# Patient Record
Sex: FEMALE | Race: WHITE
[De-identification: names, ages, dates, MRNs, and addresses within clinical notes are randomized per-mention and may not be internally consistent; named-entity substitution may affect disease eponyms.]

---

## 2018-04-10 ENCOUNTER — HOSPITAL ENCOUNTER (EMERGENCY)
Dept: HOSPITAL 97 - ER | Age: 73
Discharge: HOME | End: 2018-04-10
Payer: COMMERCIAL

## 2018-04-10 DIAGNOSIS — M79.671: Primary | ICD-10-CM

## 2018-04-10 DIAGNOSIS — Z88.6: ICD-10-CM

## 2018-04-10 DIAGNOSIS — Z88.0: ICD-10-CM

## 2018-04-10 PROCEDURE — 99283 EMERGENCY DEPT VISIT LOW MDM: CPT

## 2018-04-10 NOTE — RAD REPORT
EXAM DESCRIPTION:  RAD - Foot Right 3 View - 4/10/2018 11:43 am

 

CLINICAL HISTORY:  Foot and ankle pain.

 

COMPARISON:  None.

 

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 Prominent c
alcaneal spur is noted along the plantar aspect. No acute fracture or dislocation seen.

## 2018-04-10 NOTE — EDPHYS
Physician Documentation                                                                           

 [facility]                                                                

Name: Corrie Carter                                                                             

Age: 72 yrs                                                                                       

Sex: Female                                                                                       

: 1945                                                                                   

MRN: [id number]                                                                                   

Arrival Date: 04/10/2018                                                                          

Time: 10:23                                                                                       

Account#: [id number]                                                                            

Bed 12                                                                                            

Private MD: Ramy Bob ED Physician Omkar Kelly                                                                         

HPI:                                                                                              

04/10                                                                                             

11:11 This 72 yrs old  Female presents to ER via Wheelchair with complaints of Foot  jr8 

      Pain.                                                                                       

11:11 The patient presents with pain, tenderness. The complaints affect the medial aspect of  jr8 

      right foot. Context: The problem was sustained at home, resulted from an unknown cause.     

      Onset: The symptoms/episode began/occurred gradually, 2 day(s) ago. Modifying factors:      

      The symptoms are alleviated by nothing. the symptoms are aggravated by weight bearing.      

      Associated signs and symptoms: Pertinent positives:. Severity of symptoms: At their         

      worst the symptoms were mild, in the emergency department the symptoms are unchanged.       

      The patient has not experienced similar symptoms in the past. The patient has not           

      recently seen a physician. Patient stated that she was helping relative move boxes and      

      other items the other day. Now having right foot pain. Worse with weight bearing .          

                                                                                                  

Historical:                                                                                       

- Allergies:                                                                                      

10:30 Codeine;                                                                                tw2 

10:30 Darvon;                                                                                 tw2 

10:30 PENICILLINS;                                                                            tw2 

10:30 propoxyphene;                                                                           tw2 

- Home Meds:                                                                                      

10:30 levothyroxine oral [Active]; rivastigmine tartrate oral oral [Active]; bladder          tw2 

      medication [Active];                                                                        

- PSHx:                                                                                           

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 

                                                                                                  

- Immunization history:: Adult Immunizations up to date.                                          

- Social history:: Smoking status: Patient/guardian denies using tobacco.                         

                                                                                                  

                                                                                                  

ROS:                                                                                              

11:11 Eyes: Negative for injury, pain, redness, and discharge, ENT: Negative for injury,      jr8 

      pain, and discharge, Neck: Negative for injury, pain, and swelling, Cardiovascular:         

      Negative for chest pain, palpitations, and edema, Respiratory: Negative for shortness       

      of breath, cough, wheezing, and pleuritic chest pain, Abdomen/GI: Negative for              

      abdominal pain, nausea, vomiting, diarrhea, and constipation, Back: Negative for injury     

      and pain, Skin: Negative for injury, rash, and discoloration, Neuro: Negative for           

      headache, weakness, numbness, tingling, and seizure.                                        

11:11 MS/extremity: Positive for pain, tenderness, of the right foot.                             

                                                                                                  

Exam:                                                                                             

11:11 Cardiovascular:  Regular rate and rhythm with a normal S1 and S2.  No gallops, murmurs, jr8 

      or rubs.  Normal PMI, no JVD.  No pulse deficits. Respiratory:  Lungs have equal breath     

      sounds bilaterally, clear to auscultation and percussion.  No rales, rhonchi or wheezes     

      noted.  No increased work of breathing, no retractions or nasal flaring. Skin:  Warm,       

      dry with normal turgor.  Normal color with no rashes, no lesions, and no evidence of        

      cellulitis. Neuro:  Awake and alert, GCS 15, oriented to person, place, time, and           

      situation.  Cranial nerves II-XII grossly intact.  Motor strength 5/5 in all                

      extremities.  Sensory grossly intact.  Cerebellar exam normal.  Normal gait.                

11:11 Musculoskeletal/extremity: Extremities: grossly normal except: noted in the medial          

      aspect of right foot: pain, tenderness, ROM: intact in all extremities, full active         

      range of motion, full passive range of motion, Circulation is intact in all                 

      extremities. Sensation intact.                                                              

                                                                                                  

Vital Signs:                                                                                      

10:27  / 81; Pulse 86; Resp 17; Temp 98.8(O); Pulse Ox 98% on R/A; Weight 77.11 kg (R); tw2 

      Height 5 ft. 1 in. (154.94 cm) (R); Pain 6/10;                                              

11:33  / 82; Pulse 81; Resp 17; Pulse Ox 97% on R/A; Pain 5/10;                         tw2 

10:27 Body Mass Index 32.12 (77.11 kg, 154.94 cm)                                             tw2 

                                                                                                  

MDM:                                                                                              

10:36 Patient medically screened.                                                             jr8 

11:45 Data reviewed: vital signs, nurses notes, radiologic studies, plain films, and as a     jr8 

      result, I will discharge patient. Data interpreted: Pulse oximetry: on room air is 97       

      %. Interpretation: normal. Counseling: I had a detailed discussion with the patient         

      and/or guardian regarding: the historical points, exam findings, and any diagnostic         

      results supporting the discharge/admit diagnosis, radiology results, the need for           

      outpatient follow up, a podiatrist, to return to the emergency department if symptoms       

      worsen or persist or if there are any questions or concerns that arise at home.             

                                                                                                  

04/10                                                                                             

11:10 Order name: XRAY Foot RIGHT 3 View                                                      jr8 

                                                                                                  

Administered Medications:                                                                         

No medications were administered                                                                  

                                                                                                  

                                                                                                  

Disposition:                                                                                      

12:41 Co-signature as Attending Physician, Omkar Kelly MD.                                    rn  

                                                                                                  

Disposition:                                                                                      

04/10/18 11:46 Discharged to Home. Impression: Pain in right foot.                                

- Condition is Stable.                                                                            

- Discharge Instructions: Foot Contusion, Plantar Fasciitis.                                      

                                                                                                  

- Medication Reconciliation Form, Thank You Letter, Antibiotic Education, Prescription            

  Opioid Use form.                                                                                

- Follow up: Osmani Kaufman DPM; When: 5 - 6 days; Reason: Recheck today's complaints,            

  Continuance of care, Re-evaluation by your physician.                                           

- Problem is new.                                                                                 

- Symptoms have improved.                                                                         

                                                                                                  

                                                                                                  

                                                                                                  

Signatures:                                                                                       

Dispatcher [employer]                           EDMS                                                 

Omkar Kelly MD MD rn Roszak, Josh, PA                        PA   jr8                                                  

Daysi Pride RN                     RN                                                      

Nannette James RN                          RN   tw2                                                  

                                                                                                  

**************************************************************************************************

## 2018-04-10 NOTE — ER
Nurse's Notes                                                                                     

 [facility]                                                                

Name: Corrie Carter                                                                             

Age: 72 yrs                                                                                       

Sex: Female                                                                                       

: 1945                                                                                   

MRN: [id number]                                                                                   

Arrival Date: 04/10/2018                                                                          

Time: 10:23                                                                                       

Account#: [id number]                                                                            

Bed 12                                                                                            

Private MD: Ramy Bob                                                                       

Diagnosis: Pain in right foot                                                                     

                                                                                                  

Presentation:                                                                                     

04/10                                                                                             

10:26 Presenting complaint: Patient states: "i have injured my right arch and ankle area, i   tw2 

      was helping my youngest move this weekend". Transition of care: patient was not             

      received from another setting of care. Onset of symptoms was April 10, 2018. Care prior     

      to arrival: None.                                                                           

10:26 Method Of Arrival: Wheelchair                                                           tw2 

10:26 Acuity: MARIE 4                                                                           tw2 

                                                                                                  

Historical:                                                                                       

- Allergies:                                                                                      

10:30 Codeine;                                                                                tw2 

10:30 Darvon;                                                                                 tw2 

10:30 PENICILLINS;                                                                            tw2 

10:30 propoxyphene;                                                                           tw2 

- Home Meds:                                                                                      

10:30 levothyroxine oral [Active]; rivastigmine tartrate oral oral [Active]; bladder          tw2 

      medication [Active];                                                                        

- PSHx:                                                                                           

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 

                                                                                                  

- Immunization history:: Adult Immunizations up to date.                                          

- Social history:: Smoking status: Patient/guardian denies using tobacco.                         

                                                                                                  

                                                                                                  

Screening:                                                                                        

10:58 Abuse screen: Denies threats or abuse. Nutritional screening: No deficits noted.        tw2 

      Tuberculosis screening: No symptoms or risk factors identified. Fall Risk None              

      identified.                                                                                 

                                                                                                  

Assessment:                                                                                       

10:58 General: Appears in no apparent distress. well groomed, Behavior is calm, cooperative,  tw2 

      appropriate for age. Pain: Complains of pain in medial aspect of right foot, anterior       

      aspect of right ankle and dorsum of right foot.                                             

11:24 Reassessment: Patient appears in no apparent distress at this time. No changes from     tw2 

      previously documented assessment. Patient and/or family updated on plan of care and         

      expected duration. Pain level reassessed. Patient is alert, oriented x 3, equal             

      unlabored respirations, skin warm/dry/pink.                                                 

                                                                                                  

Vital Signs:                                                                                      

10:27  / 81; Pulse 86; Resp 17; Temp 98.8(O); Pulse Ox 98% on R/A; Weight 77.11 kg (R); tw2 

      Height 5 ft. 1 in. (154.94 cm) (R); Pain 6/10;                                              

11:33  / 82; Pulse 81; Resp 17; Pulse Ox 97% on R/A; Pain 5/10;                         tw2 

10:27 Body Mass Index 32.12 (77.11 kg, 154.94 cm)                                             tw2 

                                                                                                  

ED Course:                                                                                        

10:23 Patient arrived in ED.                                                                  mr  

10:23 Ramy Bob MD is Private Physician.                                               mr  

10:27 Triage completed.                                                                       tw2 

10:30 Arm band placed on.                                                                     tw2 

10:36 Reed Landry PA is PHCP.                                                               jr8 

10:36 Omkar Kelly MD is Attending Physician.                                                jr8 

10:58 Nannette James, RN is Primary Nurse.                                                        tw2 

10:58 Bed in low position. Call light in reach. Adult w/ patient.                             tw2 

10:58 No provider procedures requiring assistance completed.                                  tw2 

11:44 XRAY Foot RIGHT 3 View In Process Unspecified.                                          EDMS

11:45 Osmani Kaufman DPM is Referral Physician.                                               jr8 

11:58 Patient did not have IV access during this emergency room visit.                        hb  

                                                                                                  

Administered Medications:                                                                         

No medications were administered                                                                  

                                                                                                  

                                                                                                  

Outcome:                                                                                          

11:46 Discharge ordered by MD.                                                                jr8 

11:57 Discharged to home via wheelchair, with family.                                         hb  

11:57 Condition: stable                                                                           

11:57 Discharge instructions given to patient, family, Instructed on discharge instructions,      

      follow up and referral plans. Demonstrated understanding of instructions, follow-up         

      care.                                                                                       

11:58 Patient left the ED.                                                                    hb  

                                                                                                  

Signatures:                                                                                       

Dispatcher [employer]                           EDMelinda Vasquez                                mr                                                   

Reed Landry PA PA   jr8                                                  

Daysi Pride RN                     RN                                                      

Nannette James RN                          RN   tw2                                                  

                                                                                                  

**************************************************************************************************

## 2019-12-31 ENCOUNTER — HOSPITAL ENCOUNTER (EMERGENCY)
Dept: HOSPITAL 97 - ER | Age: 74
Discharge: HOME | End: 2019-12-31
Payer: COMMERCIAL

## 2019-12-31 VITALS — SYSTOLIC BLOOD PRESSURE: 140 MMHG | DIASTOLIC BLOOD PRESSURE: 67 MMHG

## 2019-12-31 VITALS — OXYGEN SATURATION: 99 %

## 2019-12-31 VITALS — TEMPERATURE: 97.3 F

## 2019-12-31 DIAGNOSIS — Z88.8: ICD-10-CM

## 2019-12-31 DIAGNOSIS — E78.5: ICD-10-CM

## 2019-12-31 DIAGNOSIS — R42: Primary | ICD-10-CM

## 2019-12-31 DIAGNOSIS — Z79.82: ICD-10-CM

## 2019-12-31 DIAGNOSIS — E03.9: ICD-10-CM

## 2019-12-31 DIAGNOSIS — Z88.0: ICD-10-CM

## 2019-12-31 DIAGNOSIS — Z88.5: ICD-10-CM

## 2019-12-31 LAB
BUN BLD-MCNC: 16 MG/DL (ref 7–18)
GLUCOSE SERPLBLD-MCNC: 112 MG/DL (ref 74–106)
HCT VFR BLD CALC: 41.8 % (ref 36–45)
INR BLD: 1
LYMPHOCYTES # SPEC AUTO: 1.8 K/UL (ref 0.7–4.9)
MAGNESIUM SERPL-MCNC: 2.1 MG/DL (ref 1.8–2.4)
PMV BLD: 9.3 FL (ref 7.6–11.3)
POTASSIUM SERPL-SCNC: 4.4 MMOL/L (ref 3.5–5.1)
RBC # BLD: 4.61 M/UL (ref 3.86–4.86)
TROPONIN (EMERG DEPT USE ONLY): < 0.02 NG/ML (ref 0–0.04)

## 2019-12-31 PROCEDURE — 70544 MR ANGIOGRAPHY HEAD W/O DYE: CPT

## 2019-12-31 PROCEDURE — 85730 THROMBOPLASTIN TIME PARTIAL: CPT

## 2019-12-31 PROCEDURE — 70553 MRI BRAIN STEM W/O & W/DYE: CPT

## 2019-12-31 PROCEDURE — 84484 ASSAY OF TROPONIN QUANT: CPT

## 2019-12-31 PROCEDURE — 75809 NONVASCULAR SHUNT X-RAY: CPT

## 2019-12-31 PROCEDURE — 85025 COMPLETE CBC W/AUTO DIFF WBC: CPT

## 2019-12-31 PROCEDURE — 70450 CT HEAD/BRAIN W/O DYE: CPT

## 2019-12-31 PROCEDURE — 36415 COLL VENOUS BLD VENIPUNCTURE: CPT

## 2019-12-31 PROCEDURE — 49427 INJECTION ABDOMINAL SHUNT: CPT

## 2019-12-31 PROCEDURE — 85610 PROTHROMBIN TIME: CPT

## 2019-12-31 PROCEDURE — 80048 BASIC METABOLIC PNL TOTAL CA: CPT

## 2019-12-31 PROCEDURE — 70549 MR ANGIOGRAPH NECK W/O&W/DYE: CPT

## 2019-12-31 PROCEDURE — 93005 ELECTROCARDIOGRAM TRACING: CPT

## 2019-12-31 PROCEDURE — 83735 ASSAY OF MAGNESIUM: CPT

## 2019-12-31 PROCEDURE — 96360 HYDRATION IV INFUSION INIT: CPT

## 2019-12-31 PROCEDURE — 99284 EMERGENCY DEPT VISIT MOD MDM: CPT

## 2019-12-31 NOTE — ER
Nurse's Notes                                                                                     

 [facility]                                                                 

Name: Corrie Carter                                                                             

Age: 74 yrs                                                                                       

Sex: Female                                                                                       

: 1945                                                                                   

MRN: [id number]                                                                                   

Arrival Date: 2019                                                                          

Time: 10:49                                                                                       

Account#: [id number]                                                                            

Bed 16                                                                                            

Private MD: Ramy Bob                                                                       

Diagnosis: Vertigo                                                                                

                                                                                                  

Presentation:                                                                                     

                                                                                             

10:54 Presenting complaint: Patient states: She has been dizziness since 8:30 last night,     aj1 

      patient reports that she spoke with Dr. Bob's office and they advised her to come       

      the emergency room because she has a  shunt that they wanted to make sure was still       

      functioning correctly. Transition of care: patient was not received from another            

      setting of care. Onset of symptoms was 2019 at 20:30. Risk Assessment: Do      

      you want to hurt yourself or someone else? Patient reports no desire to harm self or        

      others. Initial Sepsis Screen: Does the patient meet any 2 criteria? No. Patient's          

      initial sepsis screen is negative. Does the patient have a suspected source of              

      infection? No. Patient's initial sepsis screen is negative. Care prior to arrival: None.    

10:54 Method Of Arrival: Wheelchair                                                           aj1 

10:54 Acuity: MARIE 3                                                                           aj1 

                                                                                                  

Triage Assessment:                                                                                

10:58 General: Appears in no apparent distress. comfortable, Behavior is calm, cooperative,   aj1 

      appropriate for age. Pain: Denies pain. Neuro: Level of Consciousness is awake, alert,      

      obeys commands, Oriented to person, place, time, situation. Cardiovascular: Patient's       

      skin is warm and dry. Respiratory: Airway is patent Respiratory effort is even,             

      unlabored, Respiratory pattern is regular, symmetrical.                                     

                                                                                                  

Historical:                                                                                       

- Allergies:                                                                                      

10:58 Codeine;                                                                                aj1 

10:58 Darvon;                                                                                 aj1 

10:58 PENICILLINS;                                                                            aj1 

10:58 PROPOXYPHENE;                                                                           aj1 

- Home Meds:                                                                                      

10:58 rivastigmine tartrate 3 mg oral cap 2 times per day [Active]; levothyroxine 75 mcg oral aj1 

      tab once daily [Active]; tolterodine 4 mg oral cp24 1 cap once daily [Active];              

      pantoprazole 40 mg oral TbEC 1 tab once daily [Active]; aspirin 81 mg Oral chew 1 tab       

      once daily [Active];                                                                        

- PMHx:                                                                                           

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 

- PSHx:                                                                                           

10:58 Cholecystectomy; Hysterectomy;                                                          aj1 

                                                                                                  

- Immunization history:: Flu vaccine is not up to date.                                           

- Social history:: Smoking status: Patient/guardian denies using tobacco.                         

- Ebola Screening: : Patient denies travel to an Ebola-affected area in the 21 days               

  before illness onset.                                                                           

- Family history:: not pertinent.                                                                 

- Hospitalizations: : No recent hospitalization is reported.                                      

                                                                                                  

                                                                                                  

Screenin:23 Abuse screen: Denies threats or abuse. Nutritional screening: No deficits noted.        em  

      Tuberculosis screening: No symptoms or risk factors identified. Fall Risk None              

      identified.                                                                                 

11:30 Patient has been NPO before screening. The patient is alert, able to follow commands.   em  

      The patient does not exhibit slurred or garbled speech The patient is not exhibiting        

      difficulty speaking. The patient does not exhibit difficulty understanding words. The       

      patient is able to swallow own secretions with no drooling or need for suction. Patient     

      tolerated one teaspoon of water. No drooling, immediate coughing, gurgling, or clearing     

      of the throat was noted. The patient tolerated 90mL of water. No drooling, immediate        

      coughing, gurgling, or clearing of the throat was noted. The patient passed the bedside     

      swallow screening. Oral medications may be given as ordered. Contact Physician for          

      further diet orders. Provider notified of bedside swallow screening results: Omkar Kelly MD.                                                                                   

                                                                                                  

Assessment:                                                                                       

11:25 General: Appears in no apparent distress. comfortable, Behavior is calm, cooperative,   em  

      Denies fever. Pain: Denies pain. Neuro: Level of Consciousness is awake, alert, obeys       

      commands, Oriented to person, place, time, situation, Appropriate for age  are         

      equal bilaterally Moves all extremities. Speech is normal, Facial symmetry appears          

      normal, Reports dizziness. Cardiovascular: Capillary refill < 3 seconds Patient's skin      

      is warm and dry. Respiratory: Airway is patent Respiratory effort is even, unlabored,       

      Respiratory pattern is regular, symmetrical. GI: Patient currently denies nausea,           

      vomiting. Derm: Skin is intact, Skin is pink, warm \T\ dry. Musculoskeletal: Capillary      

      refill < 3 seconds, Range of motion: intact in all extremities.                             

11:30 Reassessment: wheeled to CT via wheelchair.                                             em  

13:20 Reassessment: Patient appears in no apparent distress at this time. Patient and/or      em  

      family updated on plan of care and expected duration. Pain level reassessed. Patient is     

      alert, oriented x 3, equal unlabored respirations, skin warm/dry/pink. returned from        

      CT/MRI, reports s/s have improved.                                                          

14:36 Reassessment: Patient appears in no apparent distress at this time. Patient and/or      em  

      family updated on plan of care and expected duration. Pain level reassessed. Patient is     

      alert, oriented x 3, equal unlabored respirations, skin warm/dry/pink. Patient states       

      symptoms have improved.                                                                     

                                                                                                  

Vital Signs:                                                                                      

10:58  / 66; Pulse 70; Resp 18; Temp 97.3; Pulse Ox 97% on R/A; Weight 77.11 kg (R);    aj1 

      Height 5 ft. 1 in. (154.94 cm) (R); Pain 0/10;                                              

13:33  / 63; Pulse 60; Resp 18; Pulse Ox 99% on R/A; Pain 0/10;                         em  

14:37  / 67; Pulse 65; Resp 16; Pulse Ox 99% on R/A; Pain 0/10;                         em  

10:58 Body Mass Index 32.12 (77.11 kg, 154.94 cm)                                             aj1 

                                                                                                  

ED Course:                                                                                        

10:49 Patient arrived in ED.                                                                  as  

10:49 Ramy Bob MD is Private Physician.                                               as  

10:56 Triage completed.                                                                       aj1 

10:58 Arm band placed on Patient placed in an exam room.                                      aj1 

10:59 Omkar Kelly MD is Attending Physician.                                                rn  

11:01 Joseph Frost LVN is Primary Nurse.                                                     em  

11:23 Patient has correct armband on for positive identification. Placed in gown. Bed in low  em  

      position. Call light in reach. Pulse ox on. NIBP on.                                        

11:23 EKG done, by ED staff, reviewed by Omkar Kelly MD.                                      jb1 

11:33 CT Stroke Brain w/o Contrast In Process Unspecified.                                    EDMS

12:11 Shuntogram XRAY In Process Unspecified.                                                 EDMS

13:11 MRA Head Wo Cont In Process Unspecified.                                                EDMS

13:11 MRA Neck W/Wo Cont In Process Unspecified.                                              EDMS

13:11 Brain W/Wo Cont In Process Unspecified.                                                 EDMS

14:02 Ambrose Covarrubias MD is Referral Physician.                                                 rn  

14:35 No provider procedures requiring assistance completed. IV discontinued, intact,         em  

      bleeding controlled, No redness/swelling at site. Pressure dressing applied.                

                                                                                                  

Administered Medications:                                                                         

11:31 Drug: Meclizine 50 mg Route: PO;                                                        em  

13:29 Follow up: Response: No adverse reaction; Marked relief of symptoms                     em  

13:29 Drug: NS 0.9% 500 ml Route: IV; Rate: bolus; Site: right wrist;                         em  

14:36 Follow up: IV Status: Completed infusion; IV Intake: 500ml                              em  

                                                                                                  

                                                                                                  

Intake:                                                                                           

14:36 IV: 500ml; Total: 500ml.                                                                em  

                                                                                                  

Outcome:                                                                                          

14:03 Discharge ordered by MD.                                                                rn  

14:35 Discharged to home via wheelchair, with family.                                         em  

14:35 Condition: good                                                                             

14:35 Discharge instructions given to patient, family, Instructed on discharge instructions,      

      follow up and referral plans. medication usage, Demonstrated understanding of               

      instructions, follow-up care, medications, Prescriptions given X 2.                         

14:38 Patient left the ED.                                                                    em  

                                                                                                  

Signatures:                                                                                       

Dispatcher [employer]                           Mehdi Nguyen                                 jb1                                                  

Shelley Gan RN                     RN   aj1                                                  

Joseph Frost, LVN                       LVN  em                                                   

Crissy Suárez Roman, MD MD   rn                                                   

                                                                                                  

**************************************************************************************************

## 2019-12-31 NOTE — RAD REPORT
EXAM DESCRIPTION:  MRI - Brain W/Wo Cont - 12/31/2019 1:11 pm

 

CLINICAL HISTORY:   shunt, dizziness, stroke-like symptoms, focal neural deficit

 

COMPARISON:  CT head same date, MRA Head and neck same date

 

TECHNIQUE:  Sagittal and axial T1-weighted images were obtained. Axial PD/heavily T2-weighted and T2-
FLAIR images were obtained along with axial DWI/ADC mapping sequences.  Coronal heavily T2 weighted s
equence obtained.  Axial and coronal post-contrast T1-weighted images were also obtained. A 17 ml Mul
tihance contrast following utilized.

 

FINDINGS:  No intracranial hemorrhage, mass or acute infarction.  There is no edema or shift of midli
ne structures. No extra-axial fluid collections. Gray-matter/white matter junction is preserved.  Sig
nal voids are seen as a normal finding in the major intracranial vessels.  shunt is in place enteri
ng from a right parietal entry site. Tubing traverses the lateral ventricles with the tip of the shun
t tube in the brain parenchyma of the left frontal lobe. No ventriculomegaly. Scattered white matter 
T2 hyperintensities are present most likely chronic ischemic change. Brainstem, thalamus and basal ga
nglia tissues are generally spared any measurable disease. Minimal encephalomalacia at the superior a
spect right frontal lobe in the region of the right craniotomy.

 

Post-contrast images show normal enhancement. No dural thickening.

 

Mastoid air cells and paranasal sinuses are clear.

 

 

IMPRESSION:  No acute infarction. No hemorrhage, mass or acute intracranial finding.

 

Mild chronic ischemic change with little identifiable volume loss. Ventricles are normal in size.

## 2019-12-31 NOTE — RAD REPORT
EXAM DESCRIPTION:  MRI - MRA Neck W/Wo Cont - 12/31/2019 1:11 pm

 

CLINICAL HISTORY:

Stroke protocol study, dizziness, focal neurologic deficit, history of shunt

 

COMPARISON:  MRI brain same date, CT head same date

 

TECHNIQUE:  MR angiography of the cervical vasculature performed. Coronal imaging plane acquisition u
tilized. A 17 MultiHance contrast volume was utilized. Coronal reformatted images were generated and 
reviewed. Vertical axis 3D rotational projections obtained using maximum intensity projection protoco
l.

 

FINDINGS:

Aortic arch is 3 vessel configuration with no origins stenosis. Vertebral arteries show no origin shahid
noses. Vertebral arteries are codominant with no dissection or acute finding. Basilar artery unremark
able as well.

 

Bilateral common carotid and internal carotid arteries also without evidence for dissection or acute 
finding. Bilateral subclavian arteries are unremarkable.

 

No measurable atherosclerotic change.

 

IMPRESSION:  Negative MRA neck examination.

## 2019-12-31 NOTE — RAD REPORT
EXAM DESCRIPTION:  RAD - Shuntogram - 12/31/2019 12:11 pm

 

CLINICAL HISTORY:  dizziness, headache,  shunt

 

COMPARISON:  Shunt series November 2015

 

TECHNIQUE:  Multiple images of the head, neck, chest and abdomen were obtained as a shunt series

 

FINDINGS:  No abnormal bend or kink of the shunt tubing. Surgical changes are noted to the skull félix
lar to comparison.

 

No acute chest, abdomen or pelvis finding.

 

IMPRESSION:  Negative shunt series

## 2019-12-31 NOTE — RAD REPORT
EXAM DESCRIPTION:  MRI - MRA Head Wo Cont - 12/31/2019 1:10 pm

 

CLINICAL HISTORY:  CVA, dizziness, stroke-like symptoms

 

COMPARISON:  None.

 

TECHNIQUE:  Axial and coronal 3D time-of-flight image acquisition was performed. 3D rotational images
 were generated with source and reconstruction images reviewed. Horizontal and vertical axis rotation
al views generated using MIP protocol.

 

FINDINGS:  No aneurysm or vascular malformation. Basilar artery the low. From skullbase determination
 the bilateral internal carotid arteries show no dissection, stenosis or acute finding. Right anterio
r cerebral artery A1 segment is absent as a normal variant. The anterior communicating artery is pres
ent. The anterior cerebral and middle cerebral artery distribution show no occlusion, focal stenosis 
or significant atherosclerotic change. Right posterior cerebral artery is unremarkable. There are mod
erate areas of atherosclerotic narrowing in the P2 and P3 branches of the left posterior cerebral art
jayshree. No acute MRI correlate in the distribution of the left PCA.

 

IMPRESSION:  Moderately prominent atherosclerotic changes are present in the left posterior cerebral 
artery. MRI imaging shows no acute correlate in the distribution of the left PCA.

 

Remainder of the examination shows no significant intracranial atherosclerotic change, dissection or 
acute finding.

## 2019-12-31 NOTE — EDPHYS
Physician Documentation                                                                           

 [facility]                                                                 

Name: Corrie Carter                                                                             

Age: 74 yrs                                                                                       

Sex: Female                                                                                       

: 1945                                                                                   

MRN: [id number]                                                                                   

Arrival Date: 2019                                                                          

Time: 10:49                                                                                       

Account#: [id number]                                                                            

Bed 16                                                                                            

Private MD: Ramy Bob ED Physician Omkar Kelly                                                                         

HPI:                                                                                              

                                                                                             

11:17 This 74 yrs old  Female presents to ER via Wheelchair with complaints of       rn  

      Dizziness.                                                                                  

11:17 The patient presents with feeling off balance. Onset: The symptoms/episode              rn  

      began/occurred yesterday. Modifying factors: The symptoms are alleviated by holding         

      head still, lying down, the symptoms are aggravated by movement of head, standing up,       

      changing position. Severity of symptoms: At their worst the symptoms were moderate in       

      the emergency department the symptoms are unchanged. The patient has not experienced        

      similar symptoms in the past. The patient has not recently seen a physician. Reports        

      since dinner yesterday feels off balance, dizziness, no other focal neurological            

      complaint, called pcp, told to come in because has shunt. No shunt problems since           

      placement in , no fever or head injury. No vomiting. NO vision of speech problems..       

                                                                                                  

Historical:                                                                                       

- Allergies:                                                                                      

10:58 Codeine;                                                                                aj1 

10:58 Darvon;                                                                                 aj1 

10:58 PENICILLINS;                                                                            aj1 

10:58 PROPOXYPHENE;                                                                           aj1 

- Home Meds:                                                                                      

10:58 rivastigmine tartrate 3 mg oral cap 2 times per day [Active]; levothyroxine 75 mcg oral aj1 

      tab once daily [Active]; tolterodine 4 mg oral cp24 1 cap once daily [Active];              

      pantoprazole 40 mg oral TbEC 1 tab once daily [Active]; aspirin 81 mg Oral chew 1 tab       

      once daily [Active];                                                                        

- PMHx:                                                                                           

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 

- PSHx:                                                                                           

10:58 Cholecystectomy; Hysterectomy;                                                          aj1 

                                                                                                  

- Immunization history:: Flu vaccine is not up to date.                                           

- Social history:: Smoking status: Patient/guardian denies using tobacco.                         

- Ebola Screening: : Patient denies travel to an Ebola-affected area in the 21 days               

  before illness onset.                                                                           

- Family history:: not pertinent.                                                                 

- Hospitalizations: : No recent hospitalization is reported.                                      

                                                                                                  

                                                                                                  

ROS:                                                                                              

11:17 Constitutional: Negative for fever, chills, and weight loss, Eyes: Negative for injury, rn  

      pain, redness, and discharge, Neck: Negative for injury, pain, and swelling,                

      Cardiovascular: Negative for chest pain, palpitations, and edema, Respiratory: Negative     

      for shortness of breath, cough, wheezing, and pleuritic chest pain, Abdomen/GI:             

      Negative for abdominal pain, nausea, vomiting, diarrhea, and constipation,                  

      MS/Extremity: Negative for injury and deformity, Skin: Negative for injury, rash, and       

      discoloration, Neuro: Negative for headache, weakness, numbness, tingling, and seizure.     

                                                                                                  

Exam:                                                                                             

11:17 Constitutional:  This is a well developed, well nourished patient who is awake, alert,  rn  

      and in no acute distress. Head/Face:  Normocephalic, atraumatic. Eyes:  Pupils equal        

      round and reactive to light, extra-ocular motions intact.  Lids and lashes normal.          

      Conjunctiva and sclera are non-icteric and not injected.  Cornea within normal limits.      

      Periorbital areas with no swelling, redness, or edema.  NO nystagmus. ENT:  MMM             

      Cardiovascular:  Regular rate and rhythm.  No pulse deficits. Respiratory:  No              

      increased work of breathing, no retractions or nasal flaring. Abdomen/GI:  Soft,            

      non-tender MS/ Extremity:  Pulses equal, no cyanosis.  Neurovascular intact.  Full,         

      normal range of motion.  Equal circumference. Neuro:  Awake and alert, GCS 15, oriented     

      to person, place, time, and situation.  Cranial nerves II-XII grossly intact.  Motor        

      strength 5/5 in all extremities.  Sensory grossly intact.                                   

11:22 ECG was reviewed by the Attending Physician.                                            rn  

                                                                                                  

Vital Signs:                                                                                      

10:58  / 66; Pulse 70; Resp 18; Temp 97.3; Pulse Ox 97% on R/A; Weight 77.11 kg (R);    aj1 

      Height 5 ft. 1 in. (154.94 cm) (R); Pain 0/10;                                              

13:33  / 63; Pulse 60; Resp 18; Pulse Ox 99% on R/A; Pain 0/10;                         em  

14:37  / 67; Pulse 65; Resp 16; Pulse Ox 99% on R/A; Pain 0/10;                         em  

10:58 Body Mass Index 32.12 (77.11 kg, 154.94 cm)                                             aj1 

                                                                                                  

MDM:                                                                                              

10:59 Patient medically screened.                                                             rn  

12:16 Differential diagnosis: cardiac arrhythmia, CVA, generalized weakness, hypovolemia,     rn  

      idiopathic dizziness, TIA, vertigo. ED course: No acute findings on CT head, pending        

      MRI brain, and shuntogram. Symptoms began yesterday, not TPA candidate if signs of CVA      

      on MRI..                                                                                    

14:01 Data reviewed: vital signs, nurses notes, lab test result(s), EKG, radiologic studies,  rn  

      CT scan, MRI, and as a result, I will discharge patient. Counseling: I had a detailed       

      discussion with the patient and/or guardian regarding: the historical points, exam          

      findings, and any diagnostic results supporting the discharge/admit diagnosis, lab          

      results, radiology results, the need for further work-up and treatment in the hospital.     

      Response to treatment: the patient's symptoms have markedly improved after treatment,       

      and as a result, I will discharge patient. ED course: Improvement with meclizine, no        

      acute findings to suggest shunt malfunction, MRI brain and neck no acute findings to        

      suggest CVA, will dc with meclizine and will f/u with her neurologist Dr. Covarrubias. .          

                                                                                                  

                                                                                             

11:14 Order name: Magnesium; Complete Time: 13:07                                             rn  

                                                                                             

11:14 Order name: Troponin (emerg Dept Use Only); Complete Time: 13:07                        rn  

                                                                                             

11:14 Order name: Basic Metabolic Panel; Complete Time: 13:07                                 rn  

                                                                                             

11:14 Order name: CBC with Diff; Complete Time: 13:07                                         rn  

                                                                                             

11:14 Order name: Protime (+inr); Complete Time: 13:07                                        rn  

                                                                                             

11:14 Order name: Ptt, Activated; Complete Time: 13:07                                        rn  

                                                                                             

11:14 Order name: CT Stroke Brain w/o Contrast; Complete Time: 11:56                          rn  

                                                                                             

11:15 Order name: Shuntogram XRAY; Complete Time: 13:52                                       rn  

                                                                                             

11:56 Order name: MRA Head Wo Cont; Complete Time: 13:52                                      EDMS

                                                                                             

11:58 Order name: MRA Neck W/Wo Cont; Complete Time: 13:52                                    EDMS

                                                                                             

11:58 Order name: Brain W/Wo Cont; Complete Time: 13:52                                       EDMS

                                                                                             

11:14 Order name: EKG; Complete Time: 11:15                                                   rn  

                                                                                             

11:14 Order name: Accucheck; Complete Time: 11:52                                             rn  

                                                                                             

11:14 Order name: Cardiac monitoring; Complete Time: :                                    rn  

                                                                                             

11:14 Order name: EKG - Nurse/Tech; Complete Time: :                                      rn  

                                                                                             

11:14 Order name: IV Saline Lock; Complete Time: 11:52                                        rn  

                                                                                             

11:14 Order name: Labs collected and sent; Complete Time: :                               rn  

                                                                                             

11:14 Order name: NPO; Complete Time: :                                                   rn  

                                                                                             

11:14 Order name: O2 Per Protocol; Complete Time: :                                       rn  

                                                                                             

11:14 Order name: O2 Sat Monitoring; Complete Time: :                                     rn  

                                                                                             

11:14 Order name: Stroke Swallow Screen; Complete Time: :33                                 rn  

                                                                                                  

EC: Rate is 64 beats/min. Rhythm is regular. QRS Axis is Normal. AR interval is normal. QRS rn  

      interval is normal. QT interval is normal. No Q waves. T waves are Normal. No ST            

      changes noted. Clinical impression: Normal ECG. Interpreted by me. Reviewed by me.          

                                                                                                  

Administered Medications:                                                                         

:31 Drug: Meclizine 50 mg Route: PO;                                                        em  

13:29 Follow up: Response: No adverse reaction; Marked relief of symptoms                     em  

13:29 Drug: NS 0.9% 500 ml Route: IV; Rate: bolus; Site: right wrist;                         em  

14:36 Follow up: IV Status: Completed infusion; IV Intake: 500ml                              em  

                                                                                                  

                                                                                                  

Disposition:                                                                                      

19 14:03 Discharged to Home. Impression: Vertigo.                                           

- Condition is Stable.                                                                            

- Discharge Instructions: Vertigo.                                                                

- Prescriptions for Zofran ODT 4 mg Oral tablet,disintegrating - place 1 tablet by                

  TRANSLINGUAL route every 8 hours As needed; 20 tablet. Meclizine 25 mg Oral Tablet -            

  take 1 tablet by ORAL route every 8 hours As needed; 30 tablet.                                 

- Medication Reconciliation Form, Thank You Letter, Antibiotic Education, Prescription            

  Opioid Use form.                                                                                

- Follow up: Ambrose Covarrubias MD; When: 5 - 6 days; Reason: Recheck today's complaints,              

  Re-evaluation by your physician.                                                                

- Problem is new.                                                                                 

- Symptoms have improved.                                                                         

                                                                                                  

                                                                                                  

                                                                                                  

Signatures:                                                                                       

Dispatcher [employer]                           EDShelley Rm, RN                     RN   aj1                                                  

Joseph Frost, LVN                       LVN  em                                                   

Omkar Kelly MD MD   rn                                                   

                                                                                                  

Corrections: (The following items were deleted from the chart)                                    

11:56 11:16 MR STROKE PROTOCOL+MRI.RAD.BRZ ordered. EDMS                                      EDMS

14:37 11:32 Urine Dipstick-Ancillary ordered. rn                                              em  

14:38 14:03 2019 14:03 Discharged to Home. Impression: Vertigo. Condition is Stable.    em  

      Forms are Medication Reconciliation Form, Thank You Letter, Antibiotic Education,           

      Prescription Opioid Use. Follow up: Ambrose Covarrubias; When: 5 - 6 days; Reason: Recheck          

      today's complaints, Re-evaluation by your physician. Problem is new. Symptoms have          

      improved. rn                                                                                

                                                                                                  

**************************************************************************************************

## 2019-12-31 NOTE — RAD REPORT
EXAM DESCRIPTION:  CT - Ct Stroke Brain Wo Cont - 12/31/2019 11:33 am

 

CLINICAL HISTORY:  Dizziness

 

COMPARISON:  2013

 

TECHNIQUE:  Computed axial tomography of the head was obtained.

 

All CT scans are performed using dose optimization technique as appropriate and may include automated
 exposure control or mA/KV adjustment according to patient size.

 

FINDINGS:  An intracranial  bleed is not seen .

 

The ventricles are normal in caliber.

 

No extra-axial fluid collection is noted. Small low-density areas within the right frontal and left p
arietal lobes are unchanged

 

Right shunt traverses through the right and left lateral ventricles. The tip lies 2 centimeters into 
the left frontal lobe parenchyma. A was present within the frontal lobe parenchyma on the prior exam.


 

Fluid within the sinuses/ mastoids is not seen.

 

IMPRESSION:  No acute intracranial abnormality is seen. If patient's symptoms persist  MRI of the bra
in would be recommended.

 

Dr Kelly of the emergency room was notified at 11:33 a.m. December 31, 2019

## 2020-01-01 NOTE — EKG
Test Date:    2019-12-31               Test Time:    11:18:34

Technician:   JANA                                    

                                                     

MEASUREMENT RESULTS:                                       

Intervals:                                           

Rate:         64                                     

NH:           140                                    

QRSD:         68                                     

QT:           400                                    

QTc:          412                                    

Axis:                                                

P:            54                                     

NH:           140                                    

QRS:          22                                     

T:            39                                     

                                                     

INTERPRETIVE STATEMENTS:                                       

                                                     

Normal sinus rhythm

Normal ECG

Compared to ECG 06/06/2007 11:24:23

Sinus bradycardia no longer present



Electronically Signed On 01-01-20 08:07:08 CST by Markus Spencer

## 2021-11-07 ENCOUNTER — HOSPITAL ENCOUNTER (EMERGENCY)
Dept: HOSPITAL 97 - ER | Age: 76
Discharge: HOME | End: 2021-11-07
Payer: COMMERCIAL

## 2021-11-07 VITALS — DIASTOLIC BLOOD PRESSURE: 84 MMHG | TEMPERATURE: 97.2 F | SYSTOLIC BLOOD PRESSURE: 151 MMHG | OXYGEN SATURATION: 98 %

## 2021-11-07 DIAGNOSIS — E03.9: ICD-10-CM

## 2021-11-07 DIAGNOSIS — M65.221: Primary | ICD-10-CM

## 2021-11-07 DIAGNOSIS — Z88.0: ICD-10-CM

## 2021-11-07 DIAGNOSIS — Z98.2: ICD-10-CM

## 2021-11-07 DIAGNOSIS — E78.5: ICD-10-CM

## 2021-11-07 DIAGNOSIS — K21.9: ICD-10-CM

## 2021-11-07 PROCEDURE — 93005 ELECTROCARDIOGRAM TRACING: CPT

## 2021-11-07 PROCEDURE — 99283 EMERGENCY DEPT VISIT LOW MDM: CPT

## 2021-11-07 NOTE — ER
Nurse's Notes                                                                                     

 [facility]                                                                 

Name: Corrie Carter                                                                             

Age: 76 yrs                                                                                       

Sex: Female                                                                                       

: 1945                                                                                   

MRN: [id number]                                                                                   

Arrival Date: 2021                                                                          

Time: 07:43                                                                                       

Account#: [id number]                                                                            

Bed 9                                                                                             

Private MD: Ramy Bob                                                                       

Diagnosis: Calcific tendinitis, unspecified upper arm                                             

                                                                                                  

Presentation:                                                                                     

                                                                                             

07:48 Chief complaint: Patient states: Pt reports woke at 0300 with right elbow pain that     jl7 

      radiates to hand, denies trauma. Coronavirus screen: At this time, the client does not      

      indicate any symptoms associated with coronavirus-19. Ebola Screen: No symptoms or          

      risks identified at this time. Initial Sepsis Screen: Does the patient meet any 2           

      criteria? No. Patient's initial sepsis screen is negative. Does the patient have a          

      suspected source of infection? No. Patient's initial sepsis screen is negative. Risk        

      Assessment: Do you want to hurt yourself or someone else? Patient reports no desire to      

      harm self or others. Onset of symptoms was 2021 at 03:00. Care prior to        

      arrival: None.                                                                              

07:48 Method Of Arrival: Ambulatory                                                           jl7 

07:48 Acuity: MARIE 4                                                                           jl7 

                                                                                                  

Triage Assessment:                                                                                

07:49 General: Appears in no apparent distress. uncomfortable, Behavior is calm, cooperative, jl7 

      appropriate for age. Pain: Complains of pain in right elbow Pain currently is 9 out of      

      10 on a pain scale. Neuro: Level of Consciousness is awake, alert, obeys commands,          

      Oriented to person, place, time, situation. Cardiovascular: Patient's skin is warm and      

      dry. Respiratory: Airway is patent Respiratory effort is even, unlabored, Respiratory       

      pattern is regular, symmetrical. Derm: Skin is pink, warm \T\ dry. Musculoskeletal: Range   

      of motion: intact in all extremities.                                                       

                                                                                                  

Historical:                                                                                       

- Allergies:                                                                                      

07:49 Codeine;                                                                                jl7 

07:49 Darvon;                                                                                 jl7 

07:49 PENICILLINS;                                                                            jl7 

07:49 Propoxyphene;                                                                           jl7 

- Home Meds:                                                                                      

07:49 aspirin 81 mg Oral chew 1 tab once daily [Active]; levothyroxine 75 mcg tab once daily  jl7 

      [Active]; pantoprazole 40 mg Oral TbEC 1 tab once daily [Active]; rivastigmine tartrate     

      3 mg Oral cap 2 times per day [Active]; tolterodine 4 mg Oral cp24 1 cap once daily         

      [Active];                                                                                   

- PMHx:                                                                                           

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 

- PSHx:                                                                                           

07:49 Total abdominal hysterectomy; Cholecystectomy;  section;                        jl7 

                                                                                                  

- Immunization history:: Client reports receiving the 2nd dose of the Covid vaccine,              

  [employer].                                                                                         

- Social history:: Smoking status: Patient denies any tobacco usage or history of.                

                                                                                                  

                                                                                                  

Screenin:26 Abuse screen: Denies threats or abuse. Denies injuries from another. Nutritional        jl7 

      screening: No deficits noted. Tuberculosis screening: No symptoms or risk factors           

      identified. Fall Risk None identified.                                                      

                                                                                                  

Assessment:                                                                                       

08:26 General: see triage.                                                                    jl7 

                                                                                                  

Vital Signs:                                                                                      

07:48  / 84; Pulse 76; Resp 17; Temp 97.2; Pulse Ox 98% ; Weight 78.02 kg; Height 5 ft. jl7 

      1 in. (154.94 cm); Pain 9/10;                                                               

07:48 Body Mass Index 32.50 (78.02 kg, 154.94 cm)                                             jl7 

                                                                                                  

ED Course:                                                                                        

07:43 Patient arrived in ED.                                                                  mr  

07:43 Ramy Bob MD is Private Physician.                                               mr  

07:49 Triage completed.                                                                       jl7 

07:49 Arm band placed on right wrist.                                                         jl7 

07:51 Anjel Lindquist, RN is Primary Nurse.                                                      jl7 

07:55 Ronaldo Omalley MD is Attending Physician.                                                sp3 

08:26 Patient has correct armband on for positive identification. Bed in low position. Call   jl7 

      light in reach. Side rails up X 1.                                                          

08:26 EKG done, by ED staff, reviewed by Ronaldo Omalley MD.                                      jl7 

08:27 No provider procedures requiring assistance completed. Patient did not have IV access   jl7 

      during this emergency room visit.                                                           

08:28 Sling applied to right arm.                                                             jl7 

                                                                                                  

Administered Medications:                                                                         

No medications were administered                                                                  

                                                                                                  

                                                                                                  

Outcome:                                                                                          

08:34 Discharge ordered by MD.                                                                sp3 

08:47 Discharged to home ambulatory, with family.                                             jl7 

08:47 Condition: stable                                                                           

08:47 Discharge instructions given to patient, family, Instructed on discharge instructions,      

      follow up and referral plans. medication usage, Demonstrated understanding of               

      instructions, follow-up care, medications, Prescriptions given X 1.                         

08:47 Patient left the ED.                                                                    jl7 

                                                                                                  

Signatures:                                                                                       

Mary Garrison                                 mr                                                   

Anjel Lindquist, RN                        RN   jl7                                                  

Ronaldo Omalley, MD                        MD   sp3                                                  

                                                                                                  

**************************************************************************************************

## 2021-11-07 NOTE — EDPHYS
Physician Documentation                                                                           

 [facility]                                                                 

Name: Corrie Carter                                                                             

Age: 76 yrs                                                                                       

Sex: Female                                                                                       

: 1945                                                                                   

MRN: [id number]                                                                                   

Arrival Date: 2021                                                                          

Time: 07:43                                                                                       

Account#: [id number]                                                                            

Bed 9                                                                                             

Private MD: Ramy Bob                                                                       

ED Physician Ronaldo Omalley                                                                         

HPI:                                                                                              

                                                                                             

08:21 This 76 yrs old  Female presents to ER via Ambulatory with complaints of Arm   sp3 

      Pain.                                                                                       

08:21 76-year-old female with history of hypothyroidism, GERD, cerebral shunt now presents to 3 

      the ED for right arm pain since yesterday evening. Patient denies any trauma that she       

      can recall or exertion that may have caused her pain. Pain is described as in the elbow     

      extending down into the hand and patient points to the antecubital fossa as the primary     

      source. She denies any numbness or tingling or weakness or rash. Review of systems she      

      denies any headache, neck pain, chest pain, back pain, shortness of breath, abdominal       

      pain, pain in any other joints, travel history, known sick contacts, insect bites,          

      fever, URI symptoms, known COVID-19 contacts, any other ROS at this time..                  

                                                                                                  

Historical:                                                                                       

- Allergies:                                                                                      

07:49 Codeine;                                                                                jl7 

07:49 Darvon;                                                                                 jl7 

07:49 PENICILLINS;                                                                            jl7 

07:49 Propoxyphene;                                                                           jl7 

- Home Meds:                                                                                      

07:49 aspirin 81 mg Oral chew 1 tab once daily [Active]; levothyroxine 75 mcg tab once daily  jl7 

      [Active]; pantoprazole 40 mg Oral TbEC 1 tab once daily [Active]; rivastigmine tartrate     

      3 mg Oral cap 2 times per day [Active]; tolterodine 4 mg Oral cp24 1 cap once daily         

      [Active];                                                                                   

- PMHx:                                                                                           

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 

- PSHx:                                                                                           

07:49 Total abdominal hysterectomy; Cholecystectomy;  section;                        jl7 

                                                                                                  

- Immunization history:: Client reports receiving the 2nd dose of the Covid vaccine,              

  [employer].                                                                                         

- Social history:: Smoking status: Patient denies any tobacco usage or history of.                

                                                                                                  

                                                                                                  

ROS:                                                                                              

08:22 Constitutional: Negative for fever, chills, and weight loss, Eyes: Negative for injury, sp3 

      pain, redness, and discharge, ENT: Negative for injury, pain, and discharge, Neck:          

      Negative for injury, pain, and swelling, Cardiovascular: Negative for chest pain,           

      palpitations, and edema, Respiratory: Negative for shortness of breath, cough,              

      wheezing, and pleuritic chest pain, Abdomen/GI: Negative for abdominal pain, nausea,        

      vomiting, diarrhea, and constipation, Back: Negative for injury and pain, Skin:             

      Negative for injury, rash, and discoloration, Neuro: Negative for headache, weakness,       

      numbness, tingling, and seizure, Psych: Negative for depression, anxiety, suicide           

      ideation, homicidal ideation, and hallucinations, Allergy/Immunology: Negative for          

      hives, rash, and allergies.                                                                 

08:22 MS/extremity: Positive for Pain in the right antecubital fossa extending distally as        

      described in the HPI..                                                                      

08:22 All other systems are negative.                                                             

                                                                                                  

Exam:                                                                                             

08:23 Constitutional:  This is a well developed, well nourished patient who is awake, alert,  sp3 

      and in no acute distress. Head/Face:  Normocephalic, atraumatic. Eyes:  Pupils equal        

      round and reactive to light, extra-ocular motions intact.  Lids and lashes normal.          

      Conjunctiva and sclera are non-icteric and not injected.  Cornea within normal limits.      

      Periorbital areas with no swelling, redness, or edema. ENT:  Nares patent. No nasal         

      discharge, no septal abnormalities noted.  External auditory canals are clear.              

      Oropharynx with no redness, swelling, or masses, exudates, or evidence of obstruction,      

      uvula midline.  Mucous membranes moist. Neck:  Trachea midline, no thyromegaly or           

      masses palpated, and no cervical lymphadenopathy.  Supple, full range of motion without     

      nuchal rigidity, or vertebral point tenderness.  No Meningismus. Chest/axilla:  Normal      

      chest wall appearance and motion.  Nontender with no deformity.  No lesions are             

      appreciated. Cardiovascular:  Regular rate and rhythm with a normal S1 and S2.  No          

      gallops, murmurs, or rubs.  Normal PMI, no JVD.  No pulse deficits. Respiratory:  Lungs     

      have equal breath sounds bilaterally, clear to auscultation and percussion.  No rales,      

      rhonchi or wheezes noted.  No increased work of breathing, no retractions or nasal          

      flaring. Abdomen/GI:  Soft, non-tender, with normal bowel sounds.  No distension or         

      tympany.  No guarding or rebound.  No evidence of tenderness throughout. Skin:  Warm,       

      dry with normal turgor.  Normal color with no rashes, no lesions, and no evidence of        

      cellulitis. Neuro:  Awake and alert, GCS 15, oriented to person, place, time, and           

      situation.  Cranial nerves II-XII grossly intact.  Motor strength 5/5 in all                

      extremities.  Sensory grossly intact.  Cerebellar exam normal.  Normal gait. Psych:         

      Awake, alert, with orientation to person, place and time.  Behavior, mood, and affect       

      are within normal limits.                                                                   

08:23 Musculoskeletal/extremity: Exam is negative for Extremity exam is normal except for the     

      right elbow.  Patient has pain to tendon palpation in the antecubital fossa with            

      reproducible pain distally.  Patient has normal range of motion and normal neurological     

      exam including two-point discrimination, pain and temperature sensation, strength,          

      proprioception.  Capillary refill is normal and there is no pain in the anatomical          

      snuffbox.  Pulses are normal and regular..                                                  

08:31 ECG was reviewed by the Attending Physician. EKG demonstrates normal sinus rhythm at 60 sp3 

      bpm with normal intervals including QTC, normal QRS, normal axis, and normal ST/T           

      segments with no evidence of ischemia.  Normal EKG as per my read.                          

                                                                                                  

Vital Signs:                                                                                      

07:48  / 84; Pulse 76; Resp 17; Temp 97.2; Pulse Ox 98% ; Weight 78.02 kg; Height 5 ft. jl7 

      1 in. (154.94 cm); Pain 9/10;                                                               

07:48 Body Mass Index 32.50 (78.02 kg, 154.94 cm)                                             jl7 

                                                                                                  

MDM:                                                                                              

07:56 Patient medically screened.                                                             sp3 

08:25 Data reviewed: vital signs, nurses notes, EKG. ED course: 76-year-old female with right sp3 

      arm tendinitis. Unknown reason as to why her tendons are inflamed. At this point            

      clinically I am not highly suspicious for acute coronary syndrome, vascular compromise      

      including thoracic dissection, pulmonary process, or infectious process. Patient is in      

      no acute distress and can freely move her arm. Will obtain EKG and place patient in a       

      sling and discharged on oral NSAIDs with PCP follow-up. Strict instructions to return       

      were given including worsening symptoms, back pain, chest pain, numbness or tingling,       

      any other concerns that she may have..                                                      

                                                                                                  

                                                                                             

08:04 Order name: EKG - Nurse/Tech; Complete Time: 08:26                                      sp3 

                                                                                             

08:04 Order name: Sheri; Complete Time: 08:                                                 sp3 

                                                                                                  

Administered Medications:                                                                         

No medications were administered                                                                  

                                                                                                  

                                                                                                  

Disposition Summary:                                                                              

21 08:34                                                                                    

Discharge Ordered                                                                                 

      Location: Home                                                                          sp3 

      Condition: Stable                                                                       sp3 

      Diagnosis                                                                                   

        - Calcific tendinitis, unspecified upper arm                                          sp3 

      Followup:                                                                               sp3 

        - With: Private Physician                                                                  

        - When:                                                                                    

        - Reason: Recheck today's complaints                                                       

      Discharge Instructions:                                                                     

        - Discharge Summary Sheet                                                             sp3 

        - Tendinitis                                                                          sp3 

      Forms:                                                                                      

        - Medication Reconciliation Form                                                      sp3 

        - Thank You Letter                                                                    sp3 

        - Antibiotic Education                                                                sp3 

        - Prescription Opioid Use                                                             sp3 

      Prescriptions:                                                                              

        - Diclofenac Sodium 75 mg Oral Tablet Sustained Release                                    

            - take 1 tablet by ORAL route 2 times per day; 30 tablet; Refills: 0, Product     sp3 

      Selection Permitted                                                                         

Signatures:                                                                                       

Anjel Lindquist RN                        RN   jl7                                                  

Ronaldo Omalley MD MD   sp3                                                  

                                                                                                  

**************************************************************************************************

## 2021-11-08 NOTE — EKG
Test Date:    2021-11-07               Test Time:    09:30:29

Technician:   TOMMY                                     

                                                     

MEASUREMENT RESULTS:                                       

Intervals:                                           

Rate:         59                                     

TX:           132                                    

QRSD:         62                                     

QT:           404                                    

QTc:          399                                    

Axis:                                                

P:            44                                     

TX:           132                                    

QRS:          15                                     

T:            35                                     

                                                     

INTERPRETIVE STATEMENTS:                                       

                                                     

Sinus bradycardia

Low voltage QRS

Borderline ECG

Compared to ECG 12/31/2019 11:18:34

Low QRS voltage now present

Sinus rhythm no longer present



Electronically Signed On 11-08-21 18:23:53 CST by Mikey Villanueva

## 2021-11-13 NOTE — XMS REPORT
Continuity of Care Document

                          Created on:2021



Patient:DEANNA ELLIOTT

Sex:Female

:1945

External Reference #:[id number]





Demographics







                          Address                   [address] [place] [address]

 

                          Home Phone                [phone]

 

                          Mobile Phone              [phone]

 

                          Email Address             [email or web address]

 

                          Preferred Language        English

 

                          Marital Status            Unknown

 

                          Anabaptist Affiliation     Unknown

 

                          Race                      Unknown

 

                          Additional Race(s)        White



                                                    Unavailable

 

                          Ethnic Group              Unknown









Author







                          Organization              [facility]

t

 

                          Address                   [address] [place] [address] [address]



                                                    [place] [address]

 

                          Phone                     [phone]









Support







                Name            Relationship    Address         Phone

 

                Raul           Spouse          Unavailable     [phone]

 

                Raul           Child           Unavailable     [phone]









Care Team Providers







                    Name                Role                Phone

 

                    Ige-Odunjesus_J_AH    Attending Clinician Unavailable

 

                    SUSAN Joaquin RN       Attending Clinician Unavailable

 

                    Only,  Test         Attending Clinician Unavailable

 

                    Maribel DORSEY         Attending Clinician [phone]

 

                    Doctor Unassigned,  Name Attending Clinician Unavailable

 

                    UNKNOWN             Attending Clinician Unavailable

 

                    Lab,  Fam Pob I     Attending Clinician Unavailable

 

                    Unknown             Attending Clinician Unavailable

 

                    ANENE               Attending Clinician Unavailable

 

                    Anene FNP           Attending Clinician [phone]

 

                    Ige-Odunjesus_J_AH    Admitting Clinician Unavailable









Payers







           Payer Name Policy Type Policy Number Effective Date Expiration Date HARRISON bhatmeliton

 

           KIET/KENDY            [id number]  2020-10-01            



           MEDICARE ADVANTAGE                       00:00:00              







Problems







       Condition Condition Condition Status Onset  Resolution Last   Treating Co

mments 

Source



       Name   Details Category        Date   Date   Treatment Clinician        



                                                 Date                 

 

       Overactive Overactive Problem Active 2019                             V

[facility]



       bladder Bladder               2-03                               Family



                                   00:00:                             Practic



                                   00                                 e

 

       Hypothyroi Hypothyroi Problem Active 2019                             V

illage



       dism   dism                 1-                               Family



                                   00:00:                             Practic



                                   00                                 e

 

       Gastroesop Gastroesop Problem Active 2019                             V

illage



       hageal hageal               12                               Family



       reflux Reflux               00:00:                             Practic



       disease Disease               00                                 e







Allergies, Adverse Reactions, Alerts







       Allergy Allergy Status Severity Reaction(s) Onset  Inactive Treating Comm

ents 

Source



       Name   Type                        Date   Date   Clinician        

 

       Codeine Allergy Active                                           [facility]



              to                                                      Family



              substanc                                                  Practic



              e                                                       e

 

       PENICILL Allergy Active        Hives                              [facility]



       INS    to                                                      Family



              substanc                                                  Pract[facility]



              e                                                       e

 

       NO KNOWN Drug   Active                                           [facility]



       MONO Steve                                                   [facility] of



       [facility]







Social History







           Social Habit Start Date Stop Date  Quantity   Comments   Source

 

           Sex Assigned At Birth                                             [facility]

[facility]

 

           Exposure to SARS-CoV-2                       Not sure              [facility]

[facility]



           (event[facility]                                                [facility]









                Smoking Status  Start Date      Stop Date       Source

 

                Unknown if ever smoked                                 [facility]







Medications







       Ordered Filled Start  Stop   Current Ordering Indication Dosage Frequency

 Signature

                    Comments            Components          Source



     Medication Medication Date Date Medication? Clinician                (SIG) 

          



     Name Name                                                   

 

     levothyroxi levothyroxi           No             1    Q1D  levothyrox      

     [facility]



     ne 75 mcg ne 75 mcg                                    ine 75 mcg          

 Family



     tablet Take tablet Take                                    tablet          

 Pract[facility]



     1 tablet 1 tablet                                    Take 1           e



     every day every day                                    tablet           



     by oral by oral                                    every day           



     route. route.                                    by oral           



                                                  route.           

 

     pantoprazol pantoprazol           No             1    Q1D  pantoprazo      

     [facility]



     e 40 mg e 40 mg                                    le 40 mg           Famil

y



     tablet,zohaib tablet,zohaib                                    tablet,del      

     Pract[facility]



     yed release yed release                                    ayed           e



     Take 1 Take 1                                    release           



     tablet tablet                                    Take 1           



     every day every day                                    tablet           



     by oral by oral                                    every day           



     route. route.                                    by oral           



                                                  route.           

 

     rivastigmin rivastigmin           No             1capsul BID  rivastigmi   

        [facility]



     e 3 mg e 3 mg                          e(s)      ne 3 mg           Family



     capsule capsule                                    capsule           Practi

c



     Take 1 Take 1                                    Take 1           e



     capsule capsule                                    capsule           



     twice a day twice a day                                    twice a         

  



     by oral by oral                                    day by           



     route. route.                                    oral           



                                                  route.           

 

     tolterodine tolterodine           No             1capsul Q1D  tolterodin   

        [facility]



     ER 4 mg ER 4 mg                          e(s)      e ER 4 mg           Fami

ly



     capsule,ext capsule,ext                                    capsule,ex      

     [facility]



     ended ended                                    tended           e



     release 24 release 24                                    release 24        

   



     hr Take 1 hr Take 1                                    hr Take 1           



     capsule capsule                                    capsule           



     every day every day                                    every day           



     by oral by oral                                    by oral           



     route. route.                                    route.           







Procedures







                Procedure       Date / Time Performed Performing Clinician [facility]

e

 

                ASSIGNMENT OF BENEFITS 2021 17:04:38 Doctor Unassigned, No

 [facility]







Encounters







        Start   End     Encounter Admission Attending Care    Care    Encounter 

Source



        Date/Time Date/Time Type    Type    Clinicians Facility Department ID   

   

 

        2021         Outpatient         Ige-Vickey P     [facility]     [id number]

[address] [place] [address]



        01:18:46                         _J_AH                   [id number]   Family



                                                                        Practic



                                                                        e

 

        2021 Outpatient                 [facility]    [id number]

[address] [facility]



        15:15:00 15:15:00                                                 ity [facility]

 

        2021 Outpatient                 [facility]    [id number]

[address] [facility]



        15:15:00 15:15:00                                                 ity of



                                                                        [facility]

 

        2021 Outpatient                 [facility]    [id number]

-[address] [facility]



        14:50:00 14:50:00                                         [id number]  ity of



                                                                        [facility]

 

        2021 Outpatient                 [facility]    [id number]

[address] [facility]



        14:50:00 14:50:00                                                 ity [facility]

 

        2021 Telephone         TORRES Joaquin    [phone] [id number]

[address] [facility]



        00:00:00 00:00:00                 Marcie HERZOG   350.1.13.10         i

ty of



                                                [facility] [id number]         Bennie

as



                                                        [phone]         Medi

mandy



                                                        019             Branch

 

        2021 Laboratory         Only, Adc Test [facility]    1.2.840.

[id number] 

Univers



        11:00:25 11:15:25 Only            Flip López 350.1.13.10

         ity of



                                                [place] [id number]         [place]

[place]



                                                [place]  [phone]         Medi

mandy



                                                        353             Branch

 

        2021 Outpatient R               [facility]    [id number]

-20 [facility]



        10:15:00 10:15:00                                         [id number]  ity of



                                                                        [facility]

 

        2021 Outpatient R               [facility]    [id number]

[address] [facility]



        10:15:00 10:15:00                                                 ity of



                                                                        [facility]

 

        2021 Orders          Doctor MACDONALD    [id number]

[id number] [facility]



        00:00:00 00:00:00 Only            Unassigned, ROSENDA   350.1.13.10       

  ity of



                                        [place] [facility] [id number]         Bennie

as



                                                        [phone]         Medi

mandy



                                                        009             Branch

 

        2020 Outpatient R               [facility]    [id number]

-20 [facility]



        16:40:00 16:40:00                                           ity of



                                                                        [facility]

 

        2020 Outpatient R       UNKNOWN, [facility]    [id number]

[id number] [facility]



        16:40:00 16:40:00                 ATTENDING                         ity 

[facility]

 

        2020 Laboratory         Lab, [facility] I [facility]    1.2.

[id number] 

Univers



        16:11:02 16:31:02 Only            Unknown, Attending Health  350.1.13.10

         ity of



                                                [place] [id number]         Bennie

as



                                                [facility] [phone]         [address]



                                                Office                  



                                                Building                 



                                                One                     

 

        2020-10-02 2020-10-02 Outpatient R       CHRISTIAN,  [facility]    [id number]

[id number] [facility]



        13:20:00 13:20:00                 SWETHA                         ity [facility]

 

        2020-10-02 2020-10-02 Laboratory         Lab, [facility]    1.2.840.114 [id number]

[id number] 



        12:59:35 13:19:35 Only            [facility] I Health  350.1.13.10         



                                                [place] [id number]         



                                                [facility] [phone]         



                                                [place] [address]             



                                                Office                  



                                                Building                 



                                                One                     

 

        2020-10-02 2020-10-02 Laboratory         Lab, [facility] I [facility]    1.2.

[id number] 

Univers



        12:59:35 13:19:35 Only            Anene, [facility]  350.1.13.10    

     ity of



                                                [place] [id number]         Bennie

as



                                                [facility] [phone]         [address]



                                                Office                  



                                                Building                 



                                                One                     

 

        2020 [facility]     [place]     [id number]

 [facility]



        00:00:00 00:00:00 [facility]

todd jurado NP:                          Medical -         Practi

c



                        [address]                         [email or web address]         e



                        [facility], [address]                         [place]           



                        [address],                            Direct          



                        [place],                                         



                        [place]                                              



                        [address]                                         



                        , Ph.                                           



                        [phone]                                         







Results

This patient has no known results.